# Patient Record
Sex: FEMALE | Race: WHITE | NOT HISPANIC OR LATINO | Employment: OTHER | URBAN - METROPOLITAN AREA
[De-identification: names, ages, dates, MRNs, and addresses within clinical notes are randomized per-mention and may not be internally consistent; named-entity substitution may affect disease eponyms.]

---

## 2020-06-18 ENCOUNTER — OFFICE VISIT (OUTPATIENT)
Dept: BARIATRICS/WEIGHT MGMT | Facility: CLINIC | Age: 70
End: 2020-06-18

## 2020-06-18 VITALS
SYSTOLIC BLOOD PRESSURE: 140 MMHG | HEIGHT: 64 IN | HEART RATE: 133 BPM | RESPIRATION RATE: 18 BRPM | WEIGHT: 180 LBS | TEMPERATURE: 96.9 F | BODY MASS INDEX: 30.73 KG/M2 | DIASTOLIC BLOOD PRESSURE: 84 MMHG

## 2020-06-18 DIAGNOSIS — K95.09 GASTRIC BAND SLIPPAGE: ICD-10-CM

## 2020-06-18 DIAGNOSIS — R11.2 NAUSEA AND VOMITING, INTRACTABILITY OF VOMITING NOT SPECIFIED, UNSPECIFIED VOMITING TYPE: ICD-10-CM

## 2020-06-18 DIAGNOSIS — Z98.84 HISTORY OF LAPAROSCOPIC ADJUSTABLE GASTRIC BANDING: ICD-10-CM

## 2020-06-18 DIAGNOSIS — R13.19 OTHER DYSPHAGIA: Primary | ICD-10-CM

## 2020-06-18 PROCEDURE — 99203 OFFICE O/P NEW LOW 30 MIN: CPT | Performed by: SURGERY

## 2020-06-18 RX ORDER — AMLODIPINE BESYLATE 10 MG/1
TABLET ORAL
COMMUNITY
Start: 2020-06-02

## 2020-06-18 RX ORDER — CYCLOBENZAPRINE HCL 10 MG
TABLET ORAL
COMMUNITY
Start: 2020-05-13

## 2020-06-18 RX ORDER — SITAGLIPTIN 100 MG/1
TABLET, FILM COATED ORAL
COMMUNITY
Start: 2020-06-17

## 2020-06-18 RX ORDER — TRAZODONE HYDROCHLORIDE 50 MG/1
TABLET ORAL
COMMUNITY
Start: 2020-05-13

## 2020-06-18 RX ORDER — ATORVASTATIN CALCIUM 20 MG/1
TABLET, FILM COATED ORAL
COMMUNITY
Start: 2020-06-02

## 2020-06-18 RX ORDER — BLOOD-GLUCOSE METER
EACH MISCELLANEOUS
COMMUNITY
Start: 2020-05-09

## 2020-06-18 RX ORDER — BLOOD SUGAR DIAGNOSTIC
STRIP MISCELLANEOUS
COMMUNITY
Start: 2020-05-09

## 2020-06-18 RX ORDER — SULFAMETHOXAZOLE AND TRIMETHOPRIM 800; 160 MG/1; MG/1
TABLET ORAL
COMMUNITY
Start: 2020-04-03

## 2020-06-18 RX ORDER — HYDROCHLOROTHIAZIDE 25 MG/1
TABLET ORAL
COMMUNITY
Start: 2020-06-02

## 2020-06-18 RX ORDER — BENAZEPRIL HYDROCHLORIDE 40 MG/1
TABLET, FILM COATED ORAL
COMMUNITY
Start: 2020-06-02

## 2020-06-18 RX ORDER — LANCETS
EACH MISCELLANEOUS
COMMUNITY
Start: 2020-05-09

## 2020-06-18 NOTE — PROGRESS NOTES
[facility] BARIATRIC SURGERY  [address] [address]  [phone]  [address] [address]  [phone]  Dept: [phone]  6/18/2020      Jazmyn Medina.  [id number]  [id number]  1950  female      Chief Complaint   Patient presents with   • Follow-up     Band Slip/Last seen 2011        Post-Op Bariatric Surgery:   Jazmyn Medina is status post Lapband procedure aps, performed on 7/27/09 [facility].     HPI:   Today's weight is 81.6 kg (180 lb)  pounds, today's BMI is Body mass index is 30.47 kg/m². and she has a loss of 12 pounds since the last visit. The patient reports decreased hunger and  loss of appetite.     Patient admits to nausea and dysphagia. The patient denies abdominal pain. The patientdoes have vomitng. The patientdoes have reflux.    Diet and Exercise: Diet history reviewed and discussed with the patient. Weight loss/gains to date discussed with the patient. She reports eating 3 meals per day, a typical portion size of 1 cup, eating 3 snack per day, drinking 3 8-oz. glasses of water per day. The patient cannot tolerate solid protein.   The patient is not eating protein first. The patient is limiting food volume. The patient is not taking vitamins. The patient is limiting snacking. The patient is not exercising regularly. She is not drinking carbonated beverages.     The following portions of the patient's history were reviewed and updated as appropriate: allergies, current medications, past family history, past medical history, past social history, past surgical history and problem list.    Vitals:    06/18/20 1446   BP: 140/84   Pulse: (!) 133   Resp: 18   Temp: 96.9 °F (36.1 °C)       Review of Systems   Constitutional: Positive for fatigue.   Gastrointestinal: Positive for nausea and vomiting.   Musculoskeletal: Positive for arthralgias and back pain.   All other systems reviewed and are  negative.      Physical Exam   Constitutional: She is oriented to person, place, and time. She appears well-nourished.   HENT:   Head: Normocephalic and atraumatic.   Mouth/Throat: Oropharynx is clear and moist.   Eyes: Pupils are equal, round, and reactive to light. Conjunctivae and EOM are normal. No scleral icterus.   Neck: Normal range of motion. Neck supple. No thyromegaly present.   Cardiovascular:   Tachy   Pulmonary/Chest: Effort normal and breath sounds normal.   Abdominal: Soft. Bowel sounds are normal. She exhibits no distension. There is no tenderness. There is no rebound and no guarding. No hernia.   Port palpated left upper quadrant   Musculoskeletal: Normal range of motion.   Lymphadenopathy:     She has no cervical adenopathy.   Neurological: She is alert and oriented to person, place, and time. No cranial nerve deficit. Coordination normal.   Skin: Skin is warm and dry. No erythema.   Psychiatric: She has a normal mood and affect. Her behavior is normal.   Vitals reviewed.        Assessment: Post-operatively the patient status post lap band 2009 who we have not seen since 2011.  She states that she noticing that she was eating larger portions and [facility]  He performed an upper GI which thought there was some band slippage.  Patient states that she does tolerate solids but does have occasional difficulty with solid foods where she would regurgitate.  She states that she has been tolerating liquids okay and feeling well.  She states that she has gained 8 pounds over the past few weeks.  I had a long discussion with the patient regarding band slip and that although the fluid needed to be removed which she understood and was done.  All of the fluid was removed as below.  Her heart rate was tacky and appeared to be irregular on repeat blood pressure reading.  I instructed patient to go to the emergency room which she agreed to do.  She denied any  shortness of air, heart palpitations, chest pain or any problems.  I ordered upper GI which she will go ahead and get and will follow-up after that procedure on the same day.  I instructed her that if the band has slipped that most likely we will need to remove the band but possibly could revise it depending on how things look.  Patient would like to keep the band if at all possible.  I instructed patient that if she is still has difficulty tolerating solids over the next 2 days to contact my office and we will have upper GI done this week.  Approximately 35 minutes was spent with the patient and over 30 minutes spent counseling    Encounter Diagnoses   Name Primary?   • Other dysphagia Yes   • Nausea and vomiting, intractability of vomiting not specified, unspecified vomiting type    • History of laparoscopic adjustable gastric banding    • Gastric band slippage        Plan:    My impression is Jazmyn Medina has too much restriction of her band.  I think she would benefit from fluid removal from her band.  Under aseptic conditions, I accessed the port and removed 7.9cc to bring the total band volume to 0cc.  She was able to tolerate a glass of water at the conclusion of the fill.  She was advised to consume warm liquids for today and then soft solids for 24 hours before advancing back to a regular diet.   RTC for n/v/d/regurg.     Reviewed the importance of being able to tolerate dense/solid protein and vegetables for weight loss. Discussed eating foods that are easier to tolerate, softer foods, usually contribute to weight gain because they are higher calorie/carb and will not keep patient full for the recommended 3-4 hours.      She should follow-up in 2 to 3 weeks.      UGI ordered    Activity restrictions: None.   Instructions / Recommendations: dietary counseling recommended, recommended a daily protein intake of  grams, patient was advised that the lap band system works best when consuming solid  foods, vitamin supplement(s) recommended, recommended exercising at least 150 minutes per week, behavior modifications recommended and instructed to call the office for concerns, questions, or problems.

## 2020-06-30 ENCOUNTER — HOSPITAL ENCOUNTER (OUTPATIENT)
Dept: GENERAL RADIOLOGY | Facility: HOSPITAL | Age: 70
Discharge: HOME | End: 2020-06-30
Admitting: SURGERY

## 2020-06-30 ENCOUNTER — OFFICE VISIT (OUTPATIENT)
Dept: BARIATRICS/WEIGHT MGMT | Facility: CLINIC | Age: 70
End: 2020-06-30

## 2020-06-30 VITALS
TEMPERATURE: 97.3 F | HEART RATE: 98 BPM | WEIGHT: 189 LBS | DIASTOLIC BLOOD PRESSURE: 56 MMHG | SYSTOLIC BLOOD PRESSURE: 100 MMHG | BODY MASS INDEX: 32.27 KG/M2 | RESPIRATION RATE: 18 BRPM | HEIGHT: 64 IN

## 2020-06-30 DIAGNOSIS — E11.69 DIABETES MELLITUS TYPE 2 IN OBESE (HCC): ICD-10-CM

## 2020-06-30 DIAGNOSIS — I10 ESSENTIAL HYPERTENSION: ICD-10-CM

## 2020-06-30 DIAGNOSIS — R13.19 OTHER DYSPHAGIA: ICD-10-CM

## 2020-06-30 DIAGNOSIS — R11.2 NAUSEA AND VOMITING, INTRACTABILITY OF VOMITING NOT SPECIFIED, UNSPECIFIED VOMITING TYPE: ICD-10-CM

## 2020-06-30 DIAGNOSIS — E66.9 DIABETES MELLITUS TYPE 2 IN OBESE (HCC): ICD-10-CM

## 2020-06-30 DIAGNOSIS — Z98.84 HISTORY OF LAPAROSCOPIC ADJUSTABLE GASTRIC BANDING: Primary | ICD-10-CM

## 2020-06-30 DIAGNOSIS — Z71.3 DIETARY COUNSELING: ICD-10-CM

## 2020-06-30 DIAGNOSIS — E66.9 OBESITY, CLASS I, BMI 30-34.9: ICD-10-CM

## 2020-06-30 DIAGNOSIS — K95.09 GASTRIC BAND SLIPPAGE: ICD-10-CM

## 2020-06-30 PROCEDURE — 74240 X-RAY XM UPR GI TRC 1CNTRST: CPT

## 2020-06-30 PROCEDURE — 99213 OFFICE O/P EST LOW 20 MIN: CPT | Performed by: SURGERY

## 2020-06-30 PROCEDURE — A9270 NON-COVERED ITEM OR SERVICE: HCPCS | Performed by: SURGERY

## 2020-06-30 PROCEDURE — 63710000001 BARIUM SULFATE 96 % RECONSTITUTED SUSPENSION: Performed by: SURGERY

## 2020-06-30 RX ADMIN — BARIUM SULFATE 60 ML: 960 POWDER, FOR SUSPENSION ORAL at 08:43

## 2020-06-30 NOTE — PROGRESS NOTES
[facility] BARIATRIC SURGERY  [address] [facility] [address]  [facility] [address]  [phone]  [address] [address]  [phone]  Dept: [phone]  6/30/2020      Jazmyn Medina.  [id number]  [id number]  1950  female      No chief complaint on file.       Post-Op Bariatric Surgery:   Jazmyn Medina is status post Lapband procedure, performed on 7/27/09 [facility] with 0mls    HPI:   Today's weight is 85.7 kg (189 lb) pounds, today's BMI is Body mass index is 31.99 kg/m². and she has a gain of 9 pounds since the last visit. The patient reports experiencing hunger, but decreased hunger and loss of appetite.     Jazmyn Medina denies abdominal pain. The patientdoes not have vomiting or regurgitation. The patientdoes not have heartburn/reflux.    Doing much better since all of the fluid was removed.  Upper GI was performed this morning and was told by radiologist that it was normal.  No heartburn or dysphagia.    Patient states their portion size is the small plate and their hunger is appropriate.    Diet and Exercise: Diet history reviewed and discussed with the patient. Weight loss/gains to date discussed with the patient. She reports eating 3 meals per day, a typical portion size of 2 cup, eating 2 snack per day, drinking 4 8-oz. glasses of water per day. The patient can tolerate solid protein.   The patient is eating protein first. The patient is limiting food volume. The patient is taking vitamins. The patient is not limiting snacking. The patient is regular exercise. She is not drinking carbonated beverages.     The following portions of the patient's history were reviewed and updated as appropriate: allergies, current medications, past family history, past medical history, past social history, past surgical history and problem list.    Review of Systems   Constitutional: Positive for fatigue.   Musculoskeletal: Positive for arthralgias and  back pain.   All other systems reviewed and are negative.      Vitals:    06/30/20 1418   BP: 100/56   Pulse: 98   Resp: 18   Temp: 97.3 °F (36.3 °C)       Physical Exam   Constitutional: She is oriented to person, place, and time. She appears well-nourished.   HENT:   Head: Normocephalic and atraumatic.   Mouth/Throat: Oropharynx is clear and moist.   Eyes: Pupils are equal, round, and reactive to light. Conjunctivae and EOM are normal. No scleral icterus.   Neck: Normal range of motion. Neck supple. No thyromegaly present.   Cardiovascular: Normal rate and regular rhythm.   Pulmonary/Chest: Effort normal and breath sounds normal.   Abdominal: Soft. Bowel sounds are normal. She exhibits no distension. There is no tenderness.   Musculoskeletal: Normal range of motion.   Lymphadenopathy:     She has no cervical adenopathy.   Neurological: She is alert and oriented to person, place, and time. No cranial nerve deficit. Coordination normal.   Skin: Skin is warm and dry. No erythema.   Psychiatric: She has a normal mood and affect. Her behavior is normal.   Vitals reviewed.        Assessment: Post-operatively the patient status post lap band July 2009 at [facility] who have not seen for several years that was seen in [facility] and thought that she may have a band slip.  I saw her earlier in the month and removed all of the fluid which was 7.9 cc.  We performed upper GI today which looks fairly normal without evidence of band slip.  Awaiting for official reading.  Patient was told by radiologist that it was normal.  She denies any difficulty with solids and denies any nausea or vomiting.  We decided to let things settle down for another month and can start putting fluid back in.  She understands importance of not having this band too tight.  She would like to have the adjustments in [facility] which is fine..  I instructed her to have the surgeon call me if any questions.  Upper GI was discussed in detail  and all questions answered.    Encounter Diagnoses   Name Primary?   • History of laparoscopic adjustable gastric banding Yes   • Obesity, Class I, BMI 30-34.9    • Essential hypertension    • Diabetes mellitus type 2 in obese (CMS/HCC)    • Dietary counseling    • Other dysphagia    • Nausea and vomiting, intractability of vomiting not specified, unspecified vomiting type    • Gastric band slippage        Plan:      No adjustment today as patient has ideal level of restriction. RTC for n/v/d/regurg. Encouraged patient to be sure to eat plenty of dense lean protein and high fiber foods like vegetables and fresh fruits while reducing simple carbohydrates. Reviewed the importance of eating solid foods vs. soft which will contribute to weight gain. Discussed the recommended amount of water to intake daily- half of body weight in ounces. Not drinking with or right after meals.    Activity restrictions: None.   Instructions / Recommendations: dietary counseling recommended, recommended a daily protein intake of  grams, patient was advised that the lap band system works best when consuming solid foods, vitamin supplement(s) recommended, recommended exercising at least 150 minutes per week inclduing both cardio and strength training, behavior modifications recommended and instructed to call the office for concerns, questions, or problems.     The patient was instructed to follow up in as needed    The patient was counseled regarding. Total time spent face to face was 20 minutes and 15 minutes was spent counseling.